# Patient Record
Sex: MALE | Race: WHITE | URBAN - METROPOLITAN AREA
[De-identification: names, ages, dates, MRNs, and addresses within clinical notes are randomized per-mention and may not be internally consistent; named-entity substitution may affect disease eponyms.]

---

## 2017-06-13 ENCOUNTER — TRANSFERRED RECORDS (OUTPATIENT)
Dept: HEALTH INFORMATION MANAGEMENT | Facility: CLINIC | Age: 22
End: 2017-06-13

## 2017-08-15 DIAGNOSIS — H53.2 DOUBLE VISION: Primary | ICD-10-CM

## 2017-08-16 ENCOUNTER — OFFICE VISIT (OUTPATIENT)
Dept: OPHTHALMOLOGY | Facility: CLINIC | Age: 22
End: 2017-08-16
Attending: OPHTHALMOLOGY
Payer: COMMERCIAL

## 2017-08-16 DIAGNOSIS — H50.05 ESOTROPIA, ALTERNATING: Primary | ICD-10-CM

## 2017-08-16 DIAGNOSIS — H53.2 DOUBLE VISION: ICD-10-CM

## 2017-08-16 PROCEDURE — 99215 OFFICE O/P EST HI 40 MIN: CPT | Mod: 25,ZF

## 2017-08-16 PROCEDURE — 92060 SENSORIMOTOR EXAMINATION: CPT | Mod: ZF | Performed by: OPHTHALMOLOGY

## 2017-08-16 RX ORDER — MESALAMINE 800 MG/1
800 TABLET, DELAYED RELEASE ORAL 3 TIMES DAILY
COMMUNITY

## 2017-08-16 RX ORDER — IRON POLYSACCHARIDE COMPLEX 150 MG
150 CAPSULE ORAL 2 TIMES DAILY
COMMUNITY

## 2017-08-16 ASSESSMENT — REFRACTION
OS_CYLINDER: SPHERE
OD_SPHERE: PLANO
OS_SPHERE: +0.50

## 2017-08-16 ASSESSMENT — VISUAL ACUITY
OD_CC: 20/20
CORRECTION_TYPE: CONTACTS
METHOD: SNELLEN - LINEAR
OS_CC: 20/20

## 2017-08-16 ASSESSMENT — REFRACTION_WEARINGRX
OS_SPHERE: -7.25
OD_SPHERE: -6.50

## 2017-08-16 ASSESSMENT — EXTERNAL EXAM - RIGHT EYE: OD_EXAM: NORMAL

## 2017-08-16 ASSESSMENT — TONOMETRY
OD_IOP_MMHG: 10
OS_IOP_MMHG: 8
IOP_METHOD: ICARE

## 2017-08-16 ASSESSMENT — EXTERNAL EXAM - LEFT EYE: OS_EXAM: NORMAL

## 2017-08-16 ASSESSMENT — CONF VISUAL FIELD
OS_NORMAL: 1
OD_NORMAL: 1

## 2017-08-16 ASSESSMENT — SLIT LAMP EXAM - LIDS
COMMENTS: NORMAL
COMMENTS: NORMAL

## 2017-08-16 NOTE — MR AVS SNAPSHOT
After Visit Summary   2017    Paulette Nieto    MRN: [id number]           Patient Information     Date Of Birth          1995        Visit Information        Provider Department      2017 1:30 PM Vicente Hammond MD [facility]        Today's Diagnoses     Esotropia, alternating - Both Eyes    -  1    Double vision           Follow-ups after your visit        Who to contact     Please call your clinic at [phone] to:    Ask questions about your health    Make or cancel appointments    Discuss your medicines    Learn about your test results    Speak to your doctor   If you have compliments or concerns about an experience at your clinic, or if you wish to file a complaint, please contact [facility] Patient Relations at [phone] or email us at [email or web address]         Additional Information About Your Visit        MyChart Information     Carefxt is an electronic gateway that provides easy, online access to your medical records. With [employer], you can request a clinic appointment, read your test results, renew a prescription or communicate with your care team.     To sign up for Carefxt visit the website at [email or web address]   You will be asked to enter the access code listed below, as well as some personal information. Please follow the directions to create your username and password.     Your access code is: [email or web address]  Expires: 11/15/2017  3:42 PM     Your access code will  in 90 days. If you need help or a new code, please contact your [facility] or call [phone] for assistance.        Care EveryWhere ID     This is your Care EveryWhere ID. This could be used by other organizations to access your [facility] medical records  [id number]         Blood Pressure from Last 3 Encounters:   No data found for BP    Weight from Last 3 Encounters:   No data found for Wt              We  Performed the Following     IOP Measurement     Lucinda-Operative Worksheet (Peds)     Sensorimotor        Primary Care Provider    None Specified       No primary provider on file.        Equal Access to Services     JM ROJAS : Tera Gandara, josé antonio cramer, marthajey romanhumbertovaleria ulloa, yves tate nayanjuliano walkeryessica dayrontabbybeena armando. So [facility] [phone].    ATENCIÓN: Si habla español, tiene a gannon disposición servicios gratuitos de asistencia lingüística. Llame al [phone].    We comply with applicable federal civil rights laws and [place] laws. We do not discriminate on the basis of race, color, national origin, age, disability sex, sexual orientation or gender identity.            Thank you!     Thank you for choosing [facility]  for your care. Our goal is always to provide you with excellent care. Hearing back from our patients is one way we can continue to improve our services. Please take a few minutes to complete the written survey that you may receive in the mail after your visit with us. Thank you!             Your Updated Medication List - Protect others around you: Learn how to safely use, store and throw away your medicines at www.disposemymeds.org.          This list is accurate as of: 8/16/17 11:59 PM.  Always use your most recent med list.                   Brand Name Dispense Instructions for use Diagnosis    iron polysaccharides 150 MG capsule    NIFEREX     Take 150 mg by mouth 2 times daily        mesalamine 800 MG EC tablet    ASACOL HD     Take 800 mg by mouth 3 times daily        PREDNISONE PO      Take 20 mg by mouth 2 times daily

## 2017-08-16 NOTE — LETTER
2017    RE: Paulette Nieto  : 1995  MRN: [id number]    Dear Dr. Cee,    Thank you for referring your patient, Paulette Nieto, to my neuro-ophthalmology clinic recently.  After a thorough neuro-ophthalmic history and examination, I came to the following conclusions:     1. Congenital esotropia with strong right eye preference and suppression in the left eye under binocular conditions most of the time:    The patient is a 22 year old here for the initial evaluation of crossing of the left eye referred by Dr. Sonia Cee at [facility]. This was present for many years and was probably first noted around the age of 20 years old.  He only has occasional binocular horizontal double vision. Eye misalignment or lazy eye not when patient was a young child. Has headache at least once a day. He denies pulsatile tinnitus. Dad does not have older pictures when his eyes were straight.     Afferent examination was normal. Efferent examination is significant for comitant esotropia and small hypertropia as well bilateral mild inferior oblique overaction worse in the left eye. There is a bilateral disassociated vertical deviation and refixation movements on alternate cover testing that support the congenital nature of his strabismus. Slit lamp and dilated fundus examination were unremarkable.    Paulette's presentation is consistent with longstanding esotropia manifest in early childhood.  In clinic he did not appear to have binocular potential suggesting a childhood small angle esotropia that may have increased in size with time making it more visible.  He was highly interested in reconstructive strabismus surgery.  This may or may not affect his occasional diplopia or headaches.    We discussed in detail the risks, benefits, and alternatives of eye muscle correction surgery including the very rare risk of death or serious morbidity from a general anesthesia complication and the rare risk of severe  vision loss in the operative eye(s) secondary to retinal detachment or endophthalmitis.  We discussed more likely sub-optimal outcomes including the unanticipated need for additional strabismus surgery.  Finally the patient was aware that while unlikely given his lack of binocularity, prisms glasses may be required to alleviate diplopia following surgery.    After a thorough discussion of these risks, the patient decided to proceed with strabismus surgery.  The surgical plan is as follows:     1. Bilateral medial rectus recessions (one on adjustable suture)    Follow-up 1 week after surgery.    Again, thank you for trusting me with the care of your patient.  For further exam details, please feel free to contact our office for additional records.  If you wish to contact me regarding this patient please email me at [email or web address] or give my clinic a call to arrange a phone conversation.    Sincerely,    Vicente Hammond MD  , Neuro-Ophthalmology and Adult Strabismus  Department of Ophthalmology and Visual Neurosciences  [facility]    DX: congenital esotropia

## 2017-08-16 NOTE — PROGRESS NOTES
1. Congenital esotropia with strong right eye preference and suppression in the left eye under binocular conditions most of the time:    The patient is a 22 year old here for the initial evaluation of crossing of the left eye referred by Dr. Sonia Cee at [facility]. This was present for many years and was probably first noted around the age of 20 years old.  He only has occasional binocular horizontal double vision. Eye misalignment or lazy eye not when patient was a young child. Has headache at least once a day. He denies pulsatile tinnitus. Dad does not have older pictures when his eyes were straight.     Afferent examination was normal. Efferent examination is significant for comitant esotropia and small hypertropia as well bilateral mild inferior oblique overaction worse in the left eye. There is a bilateral disassociated vertical deviation and refixation movements on alternate cover testing that support the congenital nature of his strabismus. Slit lamp and dilated fundus examination were unremarkable.    Paulette's presentation is consistent with longstanding esotropia manifest in early childhood.  In clinic he did not appear to have binocular potential suggesting a childhood small angle esotropia that may have increased in size with time making it more visible.  He was highly interested in reconstructive strabismus surgery.  This may or may not affect his occasional diplopia or headaches.    We discussed in detail the risks, benefits, and alternatives of eye muscle correction surgery including the very rare risk of death or serious morbidity from a general anesthesia complication and the rare risk of severe vision loss in the operative eye(s) secondary to retinal detachment or endophthalmitis.  We discussed more likely sub-optimal outcomes including the unanticipated need for additional strabismus surgery.  Finally the patient was aware that while unlikely given his lack of binocularity, prisms  glasses may be required to alleviate diplopia following surgery.    After a thorough discussion of these risks, the patient decided to proceed with strabismus surgery.  The surgical plan is as follows:     1. Bilateral medial rectus recessions (one on adjustable suture)    Follow-up 1 week after surgery.     Complete documentation of historical and exam elements from today's encounter can be found in the full encounter summary report (not reduplicated in this progress note).  I personally obtained the chief complaint(s) and history of present illness.  I confirmed and edited as necessary the review of systems, past medical/surgical history, family history, social history, and examination findings as documented by others; and I examined the patient myself.  I personally reviewed the relevant tests, images, and reports as documented above.  I formulated and edited as necessary the assessment and plan and discussed the findings and management plan with the patient and family     Vicente Hammond MD

## 2017-08-16 NOTE — NURSING NOTE
Chief Complaints and History of Present Illnesses   Patient presents with     Neurologic Problem     STRAB     HPI    Symptoms:              Comments:  Edis is a 22 year old male presenting with a history of:    1. Alternating esotropia  - unsure of presentation. Likely in childhood, but not in infancy   - no history of strabismus surgery.   - no history of patching.   - always LET. Never RET per patient account.   - c/o intermittent binocular horizontal diplopia. Worse when tired  - interested in surgical intervention.   - no brain MRI done recently.   - currently on 2-10 mg prednisone BID. For ulcerative colitis. Takes during flare-ups.         2. Myopia.  - wears contacts

## 2017-08-23 ENCOUNTER — TELEPHONE (OUTPATIENT)
Dept: OPHTHALMOLOGY | Facility: CLINIC | Age: 22
End: 2017-08-23

## 2017-08-23 NOTE — TELEPHONE ENCOUNTER
----- Message from Quan Lundberg sent at 8/23/2017  2:47 PM CDT -----  Regarding: Prior Authorization/Appeal  Hi Dr. Hammond,    Raúl's surgery is scheduled with you on 10-9-17. Dad called today and left a voice message for me today asking 'what delivery format  should be used to get records sent to the insurance company.' When I called dad back, he said he had called the insurance company this morning and they asked him for Raúl's records. I asked if prior authorization was needed for Raúl's surgery and he confirmed so I offered to call to obtain that for them.    When I called [facility] in [place], [place] ([phone]), I was told that prior authorization is not needed for outpatient surgeries, but that I should speak with the pre-certification department.    I spoke with Susanne in Pre-Certification who tells me that Dr. Cee at [facility] (referring provider) requested authorization for the patient to see you and that that request was denied. Susanne offered to fax forms to me to start the appeal process so that they could review your clinic notes to determine if surgery would be authorized. She said it can take up to 15 days. We should have plenty of time since surgery isn't scheduled until 10-9.    Just wanted to keep you in the loop.    Quan Ott

## 2017-10-06 ENCOUNTER — ANESTHESIA EVENT (OUTPATIENT)
Dept: SURGERY | Facility: AMBULATORY SURGERY CENTER | Age: 22
End: 2017-10-06

## 2017-10-09 ENCOUNTER — HOSPITAL ENCOUNTER (OUTPATIENT)
Facility: AMBULATORY SURGERY CENTER | Age: 22
End: 2017-10-09
Attending: OPHTHALMOLOGY

## 2017-10-09 ENCOUNTER — ANESTHESIA (OUTPATIENT)
Dept: SURGERY | Facility: AMBULATORY SURGERY CENTER | Age: 22
End: 2017-10-09

## 2017-10-09 ENCOUNTER — SURGERY (OUTPATIENT)
Age: 22
End: 2017-10-09

## 2017-10-09 VITALS
HEART RATE: 51 BPM | DIASTOLIC BLOOD PRESSURE: 72 MMHG | TEMPERATURE: 97.3 F | BODY MASS INDEX: 23.7 KG/M2 | HEIGHT: 69 IN | SYSTOLIC BLOOD PRESSURE: 121 MMHG | RESPIRATION RATE: 12 BRPM | OXYGEN SATURATION: 100 % | WEIGHT: 160 LBS

## 2017-10-09 DIAGNOSIS — Z98.890 POSTOPERATIVE EYE STATE: Primary | ICD-10-CM

## 2017-10-09 RX ORDER — DEXAMETHASONE SODIUM PHOSPHATE 4 MG/ML
INJECTION, SOLUTION INTRA-ARTICULAR; INTRALESIONAL; INTRAMUSCULAR; INTRAVENOUS; SOFT TISSUE PRN
Status: DISCONTINUED | OUTPATIENT
Start: 2017-10-09 | End: 2017-10-09

## 2017-10-09 RX ORDER — LIDOCAINE HYDROCHLORIDE 20 MG/ML
INJECTION, SOLUTION INFILTRATION; PERINEURAL PRN
Status: DISCONTINUED | OUTPATIENT
Start: 2017-10-09 | End: 2017-10-09

## 2017-10-09 RX ORDER — PROPOFOL 10 MG/ML
INJECTION, EMULSION INTRAVENOUS CONTINUOUS PRN
Status: DISCONTINUED | OUTPATIENT
Start: 2017-10-09 | End: 2017-10-09

## 2017-10-09 RX ORDER — ONDANSETRON 2 MG/ML
4 INJECTION INTRAMUSCULAR; INTRAVENOUS EVERY 30 MIN PRN
Status: DISCONTINUED | OUTPATIENT
Start: 2017-10-09 | End: 2017-10-10 | Stop reason: HOSPADM

## 2017-10-09 RX ORDER — NALOXONE HYDROCHLORIDE 0.4 MG/ML
.1-.4 INJECTION, SOLUTION INTRAMUSCULAR; INTRAVENOUS; SUBCUTANEOUS
Status: DISCONTINUED | OUTPATIENT
Start: 2017-10-09 | End: 2017-10-10 | Stop reason: HOSPADM

## 2017-10-09 RX ORDER — KETOROLAC TROMETHAMINE 30 MG/ML
INJECTION, SOLUTION INTRAMUSCULAR; INTRAVENOUS PRN
Status: DISCONTINUED | OUTPATIENT
Start: 2017-10-09 | End: 2017-10-09

## 2017-10-09 RX ORDER — TETRACAINE HYDROCHLORIDE 5 MG/ML
1-2 SOLUTION OPHTHALMIC
Status: DISCONTINUED | OUTPATIENT
Start: 2017-10-09 | End: 2017-10-10 | Stop reason: HOSPADM

## 2017-10-09 RX ORDER — SODIUM CHLORIDE, SODIUM LACTATE, POTASSIUM CHLORIDE, CALCIUM CHLORIDE 600; 310; 30; 20 MG/100ML; MG/100ML; MG/100ML; MG/100ML
INJECTION, SOLUTION INTRAVENOUS CONTINUOUS
Status: DISCONTINUED | OUTPATIENT
Start: 2017-10-09 | End: 2017-10-10 | Stop reason: HOSPADM

## 2017-10-09 RX ORDER — FENTANYL CITRATE 50 UG/ML
INJECTION, SOLUTION INTRAMUSCULAR; INTRAVENOUS PRN
Status: DISCONTINUED | OUTPATIENT
Start: 2017-10-09 | End: 2017-10-09

## 2017-10-09 RX ORDER — GABAPENTIN 300 MG/1
300 CAPSULE ORAL ONCE
Status: COMPLETED | OUTPATIENT
Start: 2017-10-09 | End: 2017-10-09

## 2017-10-09 RX ORDER — ACETAMINOPHEN 325 MG/1
975 TABLET ORAL ONCE
Status: COMPLETED | OUTPATIENT
Start: 2017-10-09 | End: 2017-10-09

## 2017-10-09 RX ORDER — NALOXONE HYDROCHLORIDE 0.4 MG/ML
INJECTION, SOLUTION INTRAMUSCULAR; INTRAVENOUS; SUBCUTANEOUS PRN
Status: DISCONTINUED | OUTPATIENT
Start: 2017-10-09 | End: 2017-10-09

## 2017-10-09 RX ORDER — LABETALOL HYDROCHLORIDE 5 MG/ML
10 INJECTION, SOLUTION INTRAVENOUS
Status: DISCONTINUED | OUTPATIENT
Start: 2017-10-09 | End: 2017-10-09 | Stop reason: HOSPADM

## 2017-10-09 RX ORDER — SODIUM CHLORIDE, SODIUM LACTATE, POTASSIUM CHLORIDE, CALCIUM CHLORIDE 600; 310; 30; 20 MG/100ML; MG/100ML; MG/100ML; MG/100ML
INJECTION, SOLUTION INTRAVENOUS CONTINUOUS
Status: DISCONTINUED | OUTPATIENT
Start: 2017-10-09 | End: 2017-10-09 | Stop reason: HOSPADM

## 2017-10-09 RX ORDER — LIDOCAINE 40 MG/G
CREAM TOPICAL
Status: DISCONTINUED | OUTPATIENT
Start: 2017-10-09 | End: 2017-10-09 | Stop reason: HOSPADM

## 2017-10-09 RX ORDER — PROPOFOL 10 MG/ML
INJECTION, EMULSION INTRAVENOUS PRN
Status: DISCONTINUED | OUTPATIENT
Start: 2017-10-09 | End: 2017-10-09

## 2017-10-09 RX ORDER — ONDANSETRON 2 MG/ML
INJECTION INTRAMUSCULAR; INTRAVENOUS PRN
Status: DISCONTINUED | OUTPATIENT
Start: 2017-10-09 | End: 2017-10-09

## 2017-10-09 RX ORDER — FENTANYL CITRATE 50 UG/ML
25-50 INJECTION, SOLUTION INTRAMUSCULAR; INTRAVENOUS
Status: DISCONTINUED | OUTPATIENT
Start: 2017-10-09 | End: 2017-10-09 | Stop reason: HOSPADM

## 2017-10-09 RX ORDER — PREDNISOLONE ACETATE 10 MG/ML
SUSPENSION/ DROPS OPHTHALMIC
Qty: 1 BOTTLE | Refills: 0 | Status: SHIPPED | OUTPATIENT
Start: 2017-10-09

## 2017-10-09 RX ORDER — MEPERIDINE HYDROCHLORIDE 25 MG/ML
12.5 INJECTION INTRAMUSCULAR; INTRAVENOUS; SUBCUTANEOUS
Status: DISCONTINUED | OUTPATIENT
Start: 2017-10-09 | End: 2017-10-10 | Stop reason: HOSPADM

## 2017-10-09 RX ORDER — ONDANSETRON 4 MG/1
4 TABLET, ORALLY DISINTEGRATING ORAL EVERY 30 MIN PRN
Status: DISCONTINUED | OUTPATIENT
Start: 2017-10-09 | End: 2017-10-10 | Stop reason: HOSPADM

## 2017-10-09 RX ORDER — BALANCED SALT SOLUTION 6.4; .75; .48; .3; 3.9; 1.7 MG/ML; MG/ML; MG/ML; MG/ML; MG/ML; MG/ML
SOLUTION OPHTHALMIC PRN
Status: DISCONTINUED | OUTPATIENT
Start: 2017-10-09 | End: 2017-10-09 | Stop reason: HOSPADM

## 2017-10-09 RX ORDER — FENTANYL CITRATE 50 UG/ML
25-50 INJECTION, SOLUTION INTRAMUSCULAR; INTRAVENOUS EVERY 5 MIN PRN
Status: DISCONTINUED | OUTPATIENT
Start: 2017-10-09 | End: 2017-10-09 | Stop reason: HOSPADM

## 2017-10-09 RX ORDER — OXYMETAZOLINE HYDROCHLORIDE 0.05 G/100ML
SPRAY NASAL PRN
Status: DISCONTINUED | OUTPATIENT
Start: 2017-10-09 | End: 2017-10-09 | Stop reason: HOSPADM

## 2017-10-09 RX ADMIN — PROPOFOL 150 MG: 10 INJECTION, EMULSION INTRAVENOUS at 07:18

## 2017-10-09 RX ADMIN — BALANCED SALT SOLUTION 10 ML: 6.4; .75; .48; .3; 3.9; 1.7 SOLUTION OPHTHALMIC at 07:31

## 2017-10-09 RX ADMIN — LIDOCAINE HYDROCHLORIDE 60 MG: 20 INJECTION, SOLUTION INFILTRATION; PERINEURAL at 07:18

## 2017-10-09 RX ADMIN — GABAPENTIN 300 MG: 300 CAPSULE ORAL at 06:19

## 2017-10-09 RX ADMIN — KETOROLAC TROMETHAMINE 30 MG: 30 INJECTION, SOLUTION INTRAMUSCULAR; INTRAVENOUS at 08:03

## 2017-10-09 RX ADMIN — PROPOFOL 50 MG: 10 INJECTION, EMULSION INTRAVENOUS at 07:30

## 2017-10-09 RX ADMIN — ONDANSETRON 4 MG: 2 INJECTION INTRAMUSCULAR; INTRAVENOUS at 07:18

## 2017-10-09 RX ADMIN — PROPOFOL: 10 INJECTION, EMULSION INTRAVENOUS at 07:45

## 2017-10-09 RX ADMIN — NALOXONE HYDROCHLORIDE 40 MCG: 0.4 INJECTION, SOLUTION INTRAMUSCULAR; INTRAVENOUS; SUBCUTANEOUS at 08:18

## 2017-10-09 RX ADMIN — DEXAMETHASONE SODIUM PHOSPHATE 4 MG: 4 INJECTION, SOLUTION INTRA-ARTICULAR; INTRALESIONAL; INTRAMUSCULAR; INTRAVENOUS; SOFT TISSUE at 07:18

## 2017-10-09 RX ADMIN — FENTANYL CITRATE 50 MCG: 50 INJECTION, SOLUTION INTRAMUSCULAR; INTRAVENOUS at 07:13

## 2017-10-09 RX ADMIN — OXYMETAZOLINE HYDROCHLORIDE 2 SPRAY: 0.05 SPRAY NASAL at 07:24

## 2017-10-09 RX ADMIN — FENTANYL CITRATE 50 MCG: 50 INJECTION, SOLUTION INTRAMUSCULAR; INTRAVENOUS at 07:34

## 2017-10-09 RX ADMIN — PROPOFOL 200 MCG/KG/MIN: 10 INJECTION, EMULSION INTRAVENOUS at 07:18

## 2017-10-09 RX ADMIN — SODIUM CHLORIDE, SODIUM LACTATE, POTASSIUM CHLORIDE, CALCIUM CHLORIDE: 600; 310; 30; 20 INJECTION, SOLUTION INTRAVENOUS at 07:13

## 2017-10-09 RX ADMIN — FENTANYL CITRATE 50 MCG: 50 INJECTION, SOLUTION INTRAMUSCULAR; INTRAVENOUS at 07:25

## 2017-10-09 RX ADMIN — ACETAMINOPHEN 975 MG: 325 TABLET ORAL at 06:19

## 2017-10-09 RX ADMIN — PROPOFOL 50 MG: 10 INJECTION, EMULSION INTRAVENOUS at 07:25

## 2017-10-09 NOTE — IP AVS SNAPSHOT
[facility]    [address] [place] [id number]    Phone:  [phone]    Fax:  [phone]                                       After Visit Summary   10/9/2017    Raúl Nieto    MRN: [id number]           After Visit Summary Signature Page     I have received my discharge instructions, and my questions have been answered. I have discussed any challenges I see with this plan with the nurse or doctor.    ..........................................................................................................................................  Patient/Patient Representative Signature      ..........................................................................................................................................  Patient Representative Print Name and Relationship to Patient    ..................................................               ................................................  Date                                            Time    ..........................................................................................................................................  Reviewed by Signature/Title    ...................................................              ..............................................  Date                                                            Time

## 2017-10-09 NOTE — ANESTHESIA CARE TRANSFER NOTE
Patient: Raúl Nieto    Procedure(s):  Bilateral Strabismus Repair - Wound Class: I-Clean    Diagnosis: Strabismus  Diagnosis Additional Information: No value filed.    Anesthesia Type:   General     Note:  Airway :Nasal Cannula  Patient transferred to:PACU  Comments: Arrive PACU, Stable, Airway Intact  98/44, 58,18,96%      Vitals: (Last set prior to Anesthesia Care Transfer)    CRNA VITALS  10/9/2017 0753 - 10/9/2017 0832      10/9/2017             NIBP: (!)  88/35    Pulse: (!)  46    NIBP Mean: 56    SpO2: 98 %    Resp Rate (observed): 11    Resp Rate (set): 10                Electronically Signed By: RHONDA Kwan CRNA  October 9, 2017  8:32 AM

## 2017-10-09 NOTE — ANESTHESIA POSTPROCEDURE EVALUATION
Patient: Raúl Nieto    Procedure(s):  Bilateral Strabismus Repair - Wound Class: I-Clean    Diagnosis:Strabismus  Diagnosis Additional Information: No value filed.    Anesthesia Type:  General    Note:  Anesthesia Post Evaluation    Patient location during evaluation: PACU  Patient participation: Able to fully participate in evaluation  Level of consciousness: awake  Pain management: adequate  Airway patency: patent  Cardiovascular status: acceptable  Respiratory status: acceptable  Hydration status: acceptable  PONV: none             Last vitals:  Vitals:    10/09/17 0845 10/09/17 0903 10/09/17 0918   BP: 102/58 117/48 121/72   Pulse:  51    Resp: 16  12   Temp: 36.2  C (97.1  F)  36.3  C (97.3  F)   SpO2: 98% 98% 100%         Electronically Signed By: Jakob George MD  October 9, 2017  10:58 AM

## 2017-10-09 NOTE — BRIEF OP NOTE
[facility]    Brief Operative Note    Pre-operative diagnosis: Strabismus  Post-operative diagnosis Strabismus  Procedure: Procedure(s):  Bilateral Strabismus Repair, fixed suture - Wound Class: I-Clean  Surgeon: Surgeon(s) and Role:     * Vicente Hammond MD - Primary  Anesthesia: General   Estimated blood loss: Less than 1 ml  Drains: None  Specimens: None.  Findings:   Please see full operative report.  Complications: None.  Implants: None.

## 2017-10-09 NOTE — DISCHARGE INSTRUCTIONS
Instructions for after your eye muscle surgery:    Instill 1 cm of Tobradex ophthalmic ointment in the operative eye(s) in 3 times per day until follow-up with Dr. Hammond in about 1 week.  The ointment is expected to blur vision but is necessary.      You will also go home with a prescription for a steroid eye drop. Do NOT start this eye drop yet.  When you see Dr. Hammond at your post-operative visit he will tell you if and when to start the drops.    Avoid all eye pressure or trauma for 7 days.  No eye rubbing, straining, swimming, athletics, or outdoor activities in which dirt or foreign objects could enter the eye.      No water in the face for 1 week- ok to take a bath and shower immediately but don t run shower water on face.      Tylenol or ibuprofen over the counter may be used for pain.  Pain can occasionally be moderate for 1 or 2 days after surgery.  If pain is severe or does not improve greatly with over the counter medications call our office.    Return for follow-up with Dr. Hammond as already scheduled (generally about 1 week after surgery).  If you do not have an appointment already, please call Quan Lundberg at [phone] or our  at [phone] and arrange to follow-up in 1 week.    [facility]  Home Care Following Anesthesia  For 24 hours after surgery:  1. Get plenty of rest.  A responsible adult must stay with you for at least 24 hours after you leave the surgery center.  2. Do not drive or use heavy equipment.  If you have weakness or tingling, don't drive or use heavy equipment until this feeling goes away.   3. Do not drink alcohol.   4. Avoid strenuous or risky activities.  Ask for help when climbing stairs.  5. You may feel lightheaded.  IF so, sit for a few minutes before standing.  Have someone help you get up.   6. If you have nausea (feel sick to your stomach): Drink only clear liquids such as apple juice, ginger ale,  broth or 7-Up.  Rest may also help.  Be sure to drink enough fluids.  Move to a regular diet as you feel able.   7. You may have a slight fever.  Call the doctor if your fever is over 100 F (37.7 C) (taken under the tongue) or lasts longer than 24 hours.  8. You may have a dry mouth, a sore throat, muscle aches or trouble sleeping. These should go away after 24 hours.  9. Do not make important or legal decisions.             Tips for taking pain medications  To get the best pain relief possible, remember these points:    Take pain medications as directed, before pain becomes severe.    Pain medication can upset your stomach: taking it with food may help.    Constipation is a common side effect of pain medication. Drink plenty of  fluids.    Eat foods high in fiber. Take a stool softener if recommended by your doctor or pharmacist.    Do not drink alcohol, drive or operate machinery while taking pain medications.    Ask about other ways to control pain, such as with heat, ice or relaxation.    Tylenol/Acetaminophen Consumption  To help encourage the safe use of acetaminophen, the makers of TYLENOL  have lowered the maximum daily dose for single-ingredient Extra Strength TYLENOL  (acetaminophen) products sold in the [place] from 8 pills per day (4,000 mg) to 6 pills per day (3,000 mg). The dosing interval has also changed from 2 pills every 4-6 hours to 2 pills every 6 hours.    If you feel your pain relief is insufficient, you may take Tylenol/Acetaminophen in addition to your narcotic pain medication.     Be careful not to exceed 3,000 mg of Tylenol/Acetaminophen in a 24 hour period from all sources.    If you are taking extra strength Tylenol/acetaminophen (500 mg), the maximum dose is 6 tablets in 24 hours.    If you are taking regular strength acetaminophen (325 mg), the maximum dose is 9 tablets in 24 hours.    Call a doctor for any of the followin. Signs of infection (fever, growing tenderness at the surgery  site, a large amount of drainage or bleeding, severe pain, foul-smelling drainage, redness, swelling).  2. It has been over 8 to 10 hours since surgery and you are still not able to urinate (pass water).  3. Headache for over 24 hours.    Your doctor is:       Dr. Vicente Hammond, Ophthalmology: [phone]               Or dial [phone] and ask for the resident on call for:  Ophthalmology  For emergency care, call the:  [facility]:  [phone] (TTY for hearing impaired: [phone])

## 2017-10-09 NOTE — OP NOTE
ATTENDING SURGEON:  Vicente Hammond MD    DATE OF PROCEDURE:  October 9, 2017    FIRST SURGICAL ASSISTANT:  TOLU JESUS MD     ANESTHESIA:  General anesthesia    PREOPERATIVE DIAGNOSIS (ES):  1. 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 Right medial rectus recession 4.0 mm with 1/2 tendon width superior transposition  2. Left medial rectus recession 4.0 mm with 1/2 tendon width superior transposition    INDICATIONS FOR PROCEDURE:    The patient is a pleasant 22 year old male who noted esotropia since at least age 20 if not earlier.  Exam suggested a longstanding esotropia and he usually suppressed the left eye under binocular conditions.  He was eager to proceed with strabismus to re-align his eyes.  As he did not bring a pair of glasses and he typically wears contacts, we opted to defer use of adjustable suture given that he would be unable to fixate a target without correction given his approximate -7 myopic prescription in both eyes.    I warned the patient about the risks, benefits, and alternatives of eye muscle surgery including a relatively small risk for severe infection, retinal detachment, and permanent vision loss of the eye.  I also discussed the possibility of postoperative double vision.  I discussed the possibility that due to postoperative double vision or a postoperative recurrent strabismus, the patient may require additional strabismus procedures in the future.  Understanding these risks, the patient decided to proceed with strabismus surgery.      DESCRIPTION OF PROCEDURE:    After the risks, benefits, and alternatives of eye muscle surgery were discussed with the patient and the patient's questions were answered both in clinic and on the day of surgery, written informed consent was obtained and witnessed in the preoperative holding area on the  "day of surgery.  The word \"yes\" was written over both eyes indicating that the patient consented to eye muscle correction in both eyes.  An intravenous line was placed and light intravenous sedation was given.  The patient was transported to the operating room where after appropriate monitors were placed, the patient underwent induction of general anesthesia without complication.      Both eyes were prepped and draped in the usual sterile fashion for ophthalmic surgery and a lid speculum was placed in the right eye.  Forced duction testing in this eye revealed no restriction.  A trapezoidal nasal conjunctival flap was created using conjunctival forceps and William scissors.  This was reflected backwards to expose the right medial rectus muscle which was isolated using a Jamil muscle hook.  The muscle was freed from Tenon's capsule with blunt dissection using a William scissors and cotton swab.  A double armed 6-0 Vicryl suture was then woven across the width of the muscle near it's insertion of the globe and tied to the edges.  The muscle was disinserted from the globe using William scissors.  Both arms of the 6-0 Vicryl suture were then passed partial thickness through the sclera at a point measured to be 4.0 mm posterior and 1/2 tendon width superior to the physiologic muscle insertion using a caliper.  At this point, the ends of the suture were tied together.  The needles were cut off and the ends were cut short.  The conjunctival flap was then re-opposed in the surgical bed and held in place using two 6-0 plain gut sutures.  The lid speculum was removed from the operative eye.     A lid speculum was placed in the left eye.  Forced duction testing in this eye revealed no restriction.  A trapezoidal nasal conjunctival flap was created using conjunctival forceps and William scissors.  This was reflected backwards to expose the left medial rectus muscle which was isolated using a [place] muscle hook.  The " muscle was freed from Tenon's capsule with blunt dissection using a William scissors and cotton swab.  A double armed 6-0 Vicryl suture was then woven across the width of the muscle near it's insertion of the globe and tied to the edges.  The muscle was disinserted from the globe using William scissors.  Both arms of the 6-0 Vicryl suture were then passed partial thickness through the sclera at a point measured to be 4.0 mm posterior and 1/2 tendon width superior to the physiologic muscle insertion using a caliper.  At this point, the ends of the suture were tied together.  The needles were cut off and the ends were cut short.  The conjunctival flap was then re-opposed in the surgical bed and held in place using two 6-0 plain gut sutures.  The lid speculum was removed from the operative eye.     TobraDex ointment was placed in both eyes.  The patient was awakened from general anesthesia without complication and discharged to the recovery room in stable condition.       SPECIMENS REMOVED:  None.      ESTIMATED BLOOD LOSS:  1 mL or less.    INTRAOPERATIVE FLUIDS:  As per anesthesia records.      SPONGE/INSTRUMENT/NEEDLE COUNTS:  All sponge, instrument, and needle counts were correct times two.    CONDITION ON DISCHARGE FROM OPERATING ROOM:  Stable.      COMPLICATIONS:  None.     I was present for the entire procedure

## 2017-10-09 NOTE — ANESTHESIA PREPROCEDURE EVALUATION
Anesthesia Evaluation     . Pt has had prior anesthetic. Type: General           ROS/MED HX    ENT/Pulmonary:  - neg pulmonary ROS     Neurologic:  - neg neurologic ROS     Cardiovascular:  - neg cardiovascular ROS       METS/Exercise Tolerance:  >4 METS   Hematologic:  - neg hematologic  ROS       Musculoskeletal:  - neg musculoskeletal ROS       GI/Hepatic:  - neg GI/hepatic ROS   (+) Inflammatory bowel disease,       Renal/Genitourinary:  - ROS Renal section negative       Endo:  - neg endo ROS       Psychiatric:  - neg psychiatric ROS       Infectious Disease:  - neg infectious disease ROS       Malignancy:   (+) Malignancy History of Other  Other CA Active status post         Other:    (+) No chance of pregnancy C-spine cleared: N/A, no H/O Chronic Pain,no other significant disability                    Physical Exam  Normal systems: pulmonary and dental    Airway   Mallampati: I    Dental     Cardiovascular   Rhythm and rate: regular and normal      Pulmonary                     Anesthesia Plan      History & Physical Review      ASA Status:  2 .    NPO Status:  > 8 hours    Plan for General and LMA with Intravenous and Propofol induction. Maintenance will be TIVA.    PONV prophylaxis:  Ondansetron (or other 5HT-3)       Postoperative Care  Postoperative pain management:  IV analgesics.      Consents  Anesthetic plan, risks, benefits and alternatives discussed with:  Patient..                          .

## 2017-10-09 NOTE — IP AVS SNAPSHOT
MRN:[id number]                      After Visit Summary   10/9/2017    Raúl Nieto    MRN: [id number]           Thank you!     Thank you for choosing [facility] for your care. Our goal is always to provide you with excellent care. Hearing back from our patients is one way we can continue to improve our services. Please take a few minutes to complete the written survey that you may receive in the mail after you visit with us. Thank you!        Patient Information     Date Of Birth          1995        About your hospital stay     You were admitted on:  October 9, 2017 [facility]    You were discharged on:  October 9, 2017       Who to Call     For medical emergencies, please call 911.  For non-urgent questions about your medical care, please call your primary care provider or clinic, [phone]  For questions related to your surgery, please call your surgery clinic        Attending Provider     Provider Specialty    Vicente Hammond MD Ophthalmology       Primary Care Provider Office Phone # Fax #    [facility] [phone]      After Care Instructions     Discharge Medication Instructions       Do NOT take aspirin or medications containing NSAIDS for 72 hours after procedure.                  Follow-up Appointments     Follow Up - Post-Op Follow-Up Visit       As scheduled.  Bring all eye medications to follow up appointment.                  Your next 10 appointments already scheduled     Oct 18, 2017 12:30 PM CDT   Post-Op with Vicente Hammond MD   Eye Clinic ([facility])    [address]  [address] [facility]  [address] [address] [place] [id number] [phone]              Further instructions from your care team       Instructions for after your eye muscle surgery:    Instill 1 cm of Tobradex ophthalmic ointment in the operative eye(s) in 3 times per day until  follow-up with Dr. Hammond in about 1 week.  The ointment is expected to blur vision but is necessary.      You will also go home with a prescription for a steroid eye drop. Do NOT start this eye drop yet.  When you see Dr. Hammond at your post-operative visit he will tell you if and when to start the drops.    Avoid all eye pressure or trauma for 7 days.  No eye rubbing, straining, swimming, athletics, or outdoor activities in which dirt or foreign objects could enter the eye.      No water in the face for 1 week- ok to take a bath and shower immediately but don t run shower water on face.      Tylenol or ibuprofen over the counter may be used for pain.  Pain can occasionally be moderate for 1 or 2 days after surgery.  If pain is severe or does not improve greatly with over the counter medications call our office.    Return for follow-up with Dr. Hammond as already scheduled (generally about 1 week after surgery).  If you do not have an appointment already, please call [facility] at [phone] or our  at [phone] and arrange to follow-up in 1 week.    [facility]  Home Care Following Anesthesia  For 24 hours after surgery:  1. Get plenty of rest.  A responsible adult must stay with you for at least 24 hours after you leave the surgery center.  2. Do not drive or use heavy equipment.  If you have weakness or tingling, don't drive or use heavy equipment until this feeling goes away.   3. Do not drink alcohol.   4. Avoid strenuous or risky activities.  Ask for help when climbing stairs.  5. You may feel lightheaded.  IF so, sit for a few minutes before standing.  Have someone help you get up.   6. If you have nausea (feel sick to your stomach): Drink only clear liquids such as apple juice, ginger ale, broth or 7-Up.  Rest may also help.  Be sure to drink enough fluids.  Move to a regular diet as you feel able.   7. You may have a slight fever.   Call the doctor if your fever is over 100 F (37.7 C) (taken under the tongue) or lasts longer than 24 hours.  8. You may have a dry mouth, a sore throat, muscle aches or trouble sleeping. These should go away after 24 hours.  9. Do not make important or legal decisions.             Tips for taking pain medications  To get the best pain relief possible, remember these points:    Take pain medications as directed, before pain becomes severe.    Pain medication can upset your stomach: taking it with food may help.    Constipation is a common side effect of pain medication. Drink plenty of  fluids.    Eat foods high in fiber. Take a stool softener if recommended by your doctor or pharmacist.    Do not drink alcohol, drive or operate machinery while taking pain medications.    Ask about other ways to control pain, such as with heat, ice or relaxation.    Tylenol/Acetaminophen Consumption  To help encourage the safe use of acetaminophen, the makers of TYLENOL  have lowered the maximum daily dose for single-ingredient Extra Strength TYLENOL  (acetaminophen) products sold in the [place] from 8 pills per day (4,000 mg) to 6 pills per day (3,000 mg). The dosing interval has also changed from 2 pills every 4-6 hours to 2 pills every 6 hours.    If you feel your pain relief is insufficient, you may take Tylenol/Acetaminophen in addition to your narcotic pain medication.     Be careful not to exceed 3,000 mg of Tylenol/Acetaminophen in a 24 hour period from all sources.    If you are taking extra strength Tylenol/acetaminophen (500 mg), the maximum dose is 6 tablets in 24 hours.    If you are taking regular strength acetaminophen (325 mg), the maximum dose is 9 tablets in 24 hours.    Call a doctor for any of the followin. Signs of infection (fever, growing tenderness at the surgery site, a large amount of drainage or bleeding, severe pain, foul-smelling drainage, redness, swelling).  2. It has been over 8 to 10 hours since  "surgery and you are still not able to urinate (pass water).  3. Headache for over 24 hours.    Your doctor is:       Dr. Vicente Hammond, Ophthalmology: [phone]               Or dial [phone] and ask for the resident on call for:  Ophthalmology  For emergency care, call the:  [facility]:  [phone] (TTY for hearing impaired: [phone])                Pending Results     No orders found from 10/7/2017 to 10/10/2017.            Admission Information     Date & Time Provider Department Dept. Phone    10/9/2017 Vicente Hammond MD [facility] [phone]      Your Vitals Were     Blood Pressure Pulse Temperature Respirations Height Weight    108/71 49 97.8  F (36.6  C) (Oral) 16 1.753 m (5' 9\") 72.6 kg (160 lb)    Pulse Oximetry BMI (Body Mass Index)                97% 23.63 kg/m2          [employer] Information     [employer] is an electronic gateway that provides easy, online access to your medical records. With [employer], you can request a clinic appointment, read your test results, renew a prescription or communicate with your care team.     To sign up for [employer] visit the website at [email or web address]   You will be asked to enter the access code listed below, as well as some personal information. Please follow the directions to create your username and password.     Your access code is: [email or web address]  Expires: 11/15/2017  3:42 PM     Your access code will  in 90 days. If you need help or a new code, please contact your [facility] or call [phone] for assistance.        Care EveryWhere ID     This is your Care EveryWhere ID. This could be used by other organizations to access your [facility] medical records  [id number]        Equal Access to Services     JM ROJAS AH: Tera Gandara, waaxda luqadaha, qaybta kaalmada artemio, yves armando. So wa " [phone].    ATENCIÓN: Si deshaun allred, tiene a gannon disposición servicios gratuitos de asistencia lingüística. Patricia rojas [phone].    We comply with applicable federal civil rights laws and [place] laws. We do not discriminate on the basis of race, color, national origin, age, disability, sex, sexual orientation, or gender identity.               Review of your medicines      START taking        Dose / Directions    prednisoLONE acetate 1 % ophthalmic susp   Commonly known as:  PRED FORTE   Used for:  Postoperative eye state        Place 1 drop into operative eye(s) 4 times daily   Quantity:  1 Bottle   Refills:  0         CONTINUE these medicines which have NOT CHANGED        Dose / Directions    iron polysaccharides 150 MG capsule   Commonly known as:  NIFEREX        Dose:  150 mg   Take 150 mg by mouth 2 times daily   Refills:  0       mesalamine 800 MG EC tablet   Commonly known as:  ASACOL HD        Dose:  800 mg   Take 800 mg by mouth 3 times daily   Refills:  0       PREDNISONE PO        Dose:  20 mg   Take 20 mg by mouth 2 times daily   Refills:  0            Where to get your medicines      These medications were sent to [address] [address]    Hours:  TRANSPLANT PHONE NUMBER [phone] Phone:  [phone]     prednisoLONE acetate 1 % ophthalmic susp                Protect others around you: Learn how to safely use, store and throw away your medicines at www.disposemymeds.org.             Medication List: This is a list of all your medications and when to take them. Check marks below indicate your daily home schedule. Keep this list as a reference.      Medications           Morning Afternoon Evening Bedtime As Needed    iron polysaccharides 150 MG capsule   Commonly known as:  NIFEREX   Take 150 mg by mouth 2 times daily                                mesalamine 800 MG EC tablet    Commonly known as:  ASACOL HD   Take 800 mg by mouth 3 times daily                                prednisoLONE acetate 1 % ophthalmic susp   Commonly known as:  PRED FORTE   Place 1 drop into operative eye(s) 4 times daily                                PREDNISONE PO   Take 20 mg by mouth 2 times daily

## 2017-10-13 ENCOUNTER — TELEPHONE (OUTPATIENT)
Dept: OPHTHALMOLOGY | Facility: CLINIC | Age: 22
End: 2017-10-13

## 2017-10-18 ENCOUNTER — OFFICE VISIT (OUTPATIENT)
Dept: OPHTHALMOLOGY | Facility: CLINIC | Age: 22
End: 2017-10-18
Attending: OPHTHALMOLOGY
Payer: COMMERCIAL

## 2017-10-18 DIAGNOSIS — H50.05 ESOTROPIA, ALTERNATING: Primary | ICD-10-CM

## 2017-10-18 PROCEDURE — 99212 OFFICE O/P EST SF 10 MIN: CPT | Mod: ZF

## 2017-10-18 ASSESSMENT — TONOMETRY
OS_IOP_MMHG: 14
IOP_METHOD: ICARE
OD_IOP_MMHG: 15

## 2017-10-18 ASSESSMENT — VISUAL ACUITY
OS_SC: 20/600
METHOD: SNELLEN - LINEAR
OD_PH_SC: 20/50
OS_PH_SC: 20/40
OD_SC: 20/600

## 2017-10-18 ASSESSMENT — REFRACTION_WEARINGRX
OS_SPHERE: -7.25
OD_SPHERE: -6.50

## 2017-10-18 NOTE — MR AVS SNAPSHOT
After Visit Summary   10/18/2017    Raúl Nieto    MRN: [id number]           Patient Information     Date Of Birth          1995        Visit Information        Provider Department      10/18/2017 12:30 PM Vicente Hammond MD Eye Clinic         Follow-ups after your visit        Your next 10 appointments already scheduled     Dec 14, 2017 12:30 PM CST   Post-Op with Vicente Hammond MD   Eye Clinic ([facility])    [address]  [address] [facility]  [address] Clin [address]  [place] [address] [phone]              Who to contact     Please call your clinic at [phone] to:    Ask questions about your health    Make or cancel appointments    Discuss your medicines    Learn about your test results    Speak to your doctor   If you have compliments or concerns about an experience at your clinic, or if you wish to file a complaint, please contact [facility] Patient Relations at [phone] or email us at [email or web address]         Additional Information About Your Visit        FITiSThart Information     Glipho is an electronic gateway that provides easy, online access to your medical records. With Glipho, you can request a clinic appointment, read your test results, renew a prescription or communicate with your care team.     To sign up for Glipho visit the website at [email or web address]   You will be asked to enter the access code listed below, as well as some personal information. Please follow the directions to create your username and password.     Your access code is: [email or web address]  Expires: 11/15/2017  3:42 PM     Your access code will  in 90 days. If you need help or a new code, please contact your [facility] or call [phone] for assistance.        Care EveryWhere ID     This is your Care EveryWhere ID. This could be used by other organizations to access your  [facility] medical records  [id number]         Blood Pressure from Last 3 Encounters:   10/09/17 121/72    Weight from Last 3 Encounters:   10/09/17 72.6 kg (160 lb)              Today, you had the following     No orders found for display       Primary Care Provider Office Phone # Fax #    [facility] [phone]       [address] [place] [address]        Equal Access to Services     JM ROJAS : Hadii aad ku hadasho Soomaali, waaxda luqadaha, qaybta kaalmada adeegyada, waxay idiin hayaan adeeg dayrontabbysh lakobe . So [facility] [phone].    ATENCIÓN: Si habla español, tiene a gannon disposición servicios gratuitos de asistencia lingüística. Llame al [phone].    We comply with applicable federal civil rights laws and [place] laws. We do not discriminate on the basis of race, color, national origin, age, disability, sex, sexual orientation, or gender identity.            Thank you!     Thank you for choosing [facility]  for your care. Our goal is always to provide you with excellent care. Hearing back from our patients is one way we can continue to improve our services. Please take a few minutes to complete the written survey that you may receive in the mail after your visit with us. Thank you!             Your Updated Medication List - Protect others around you: Learn how to safely use, store and throw away your medicines at www.disposemymeds.org.          This list is accurate as of: 10/18/17  1:47 PM.  Always use your most recent med list.                   Brand Name Dispense Instructions for use Diagnosis    iron polysaccharides 150 MG capsule    NIFEREX     Take 150 mg by mouth 2 times daily        mesalamine 800 MG EC tablet    ASACOL HD     Take 800 mg by mouth 3 times daily        prednisoLONE acetate 1 % ophthalmic susp    PRED FORTE    1 Bottle    Place 1 drop into operative eye(s) 4 times daily    Postoperative eye state       PREDNISONE PO      Take 20 mg by  mouth 2 times daily

## 2017-10-18 NOTE — PROGRESS NOTES
1. 1 week post-op status post strabismus surgery:     -Surgery: 10/9/17    1. Right medial rectus recession 4.0 mm with 1/2 tendon width superior transposition  2. Left medial rectus recession 4.0 mm with 1/2 tendon width superior transposition    - Alignment: doing well- residual esotropia present and left hypertropia in primary gaze but patient very happy with reconstructive outcome  - Diplopia: none  - Healing up appropriately  - Tobradex ophthalmic ointment: stop  - Start Predforte 1% four times a day in the operative eye(s) and decrease by one drop daily every week.  Course will complete in 1 month.    Patient asked if he could wear contact lenses.  I advised him as I did before surgery and on the day of surgery that he needs to purchase a pair of glasses to wear so his eyes can heal.  He should not wear contact lenses for 2 months.  He said he would purchase glasses.  He has prescription.    Return to clinic in 3 months or sooner as needed.       Complete documentation of historical and exam elements from today's encounter can be found in the full encounter summary report (not reduplicated in this progress note).  I personally obtained the chief complaint(s) and history of present illness.  I confirmed and edited as necessary the review of systems, past medical/surgical history, family history, social history, and examination findings as documented by others; and I examined the patient myself.  I personally reviewed the relevant tests, images, and reports as documented above.  I formulated and edited as necessary the assessment and plan and discussed the findings and management plan with the patient and family   Vicente Hammond MD

## 2017-10-18 NOTE — NURSING NOTE
Chief Complaints and History of Present Illnesses   Patient presents with     Post Op (Ophthalmology) Both Eyes     RECESSION RESECTION WITH ADJUSTABLE SUTURE BILATERAL     HPI    Affected eye(s):  Both   Symptoms:        Duration:  1 week   Frequency:  Constant       Do you have eye pain now?:  No      Comments:  Pt. States that there has been mild discomfort BE.  No double vision.  Grace Hicks COT 12:44 PM October 18, 2017

## 2017-10-18 NOTE — NURSING NOTE
Chief Complaints and History of Present Illnesses   Patient presents with     Post Op (Ophthalmology) Both Eyes     RECESSION RESECTION WITH ADJUSTABLE SUTURE BILATERAL     HPI    Affected eye(s):  Both   Symptoms:        Duration:  1 week   Frequency:  Constant       Do you have eye pain now?:  No      Comments:  Pt. States that there has been mild discomfort BE.  No double vision.  Grace Hicks COT 12:44 PM October 18, 2017     1 week s/p strabismus surgery for history of:  . 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 Right medial rectus recession 4.0 mm with 1/2 tendon width superior transposition  2. Left medial rectus recession 4.0 mm with 1/2 tendon width superior transposition    Happy with surgical results  No diplopia  Happy with alignment  Healing well.     Jonathan MEDINA 1:17 PM October 18, 2017

## 2017-12-10 DIAGNOSIS — H53.2 DOUBLE VISION: Primary | ICD-10-CM

## 2018-01-04 DIAGNOSIS — H53.2 DOUBLE VISION: Primary | ICD-10-CM

## 2018-01-05 ENCOUNTER — OFFICE VISIT (OUTPATIENT)
Dept: OPHTHALMOLOGY | Facility: CLINIC | Age: 23
End: 2018-01-05
Attending: OPHTHALMOLOGY
Payer: COMMERCIAL

## 2018-01-05 DIAGNOSIS — H50.05 ESOTROPIA, ALTERNATING: Primary | ICD-10-CM

## 2018-01-05 DIAGNOSIS — H53.2 DOUBLE VISION: ICD-10-CM

## 2018-01-05 PROCEDURE — G0463 HOSPITAL OUTPT CLINIC VISIT: HCPCS | Mod: 25,ZF | Performed by: TECHNICIAN/TECHNOLOGIST

## 2018-01-05 PROCEDURE — 92015 DETERMINE REFRACTIVE STATE: CPT | Mod: 52,ZF

## 2018-01-05 ASSESSMENT — TONOMETRY
OS_IOP_MMHG: 07
OD_IOP_MMHG: 09
IOP_METHOD: ICARE

## 2018-01-05 ASSESSMENT — VISUAL ACUITY
OS_CC+: +2
CORRECTION_TYPE: CONTACTS
OD_CC: 20/20
METHOD: SNELLEN - LINEAR
OS_CC: 20/30

## 2018-01-05 ASSESSMENT — CONF VISUAL FIELD
OD_NORMAL: 1
OS_NORMAL: 1
METHOD: COUNTING FINGERS

## 2018-01-05 NOTE — NURSING NOTE
Chief Complaints and History of Present Illnesses   Patient presents with     Post Op (Ophthalmology) Both Eyes     strabismus surgery     HPI    Affected eye(s):  Both   Symptoms:        Frequency:  Constant       Do you have eye pain now?:  No      Comments:  States va is the same since last visit  States no diplopia  No red watery or dry  Nancie Larson COT 11:21 AM January 5, 2018

## 2018-01-05 NOTE — MR AVS SNAPSHOT
After Visit Summary   2018    Raúl Nieto    MRN: [id number]           Patient Information     Date Of Birth          1995        Visit Information        Provider Department      2018 11:30 AM [facility]        Today's Diagnoses     Double vision           Follow-ups after your visit        Follow-up notes from your care team     Return if symptoms worsen or fail to improve.      Who to contact     Please call your clinic at [phone] to:    Ask questions about your health    Make or cancel appointments    Discuss your medicines    Learn about your test results    Speak to your doctor   If you have compliments or concerns about an experience at your clinic, or if you wish to file a complaint, please contact [facility] Patient Relations at [phone] or email us at [email or web address]         Additional Information About Your Visit        MyChart Information     [employer] is an electronic gateway that provides easy, online access to your medical records. With [employer]Finder, you can request a clinic appointment, read your test results, renew a prescription or communicate with your care team.     To sign up for [employer] visit the website at [email or web address]   You will be asked to enter the access code listed below, as well as some personal information. Please follow the directions to create your username and password.     Your access code is: [email or web address]  Expires: 2018 12:07 PM     Your access code will  in 90 days. If you need help or a new code, please contact your [facility] or call [phone] for assistance.        Care EveryWhere ID     This is your Care EveryWhere ID. This could be used by other organizations to access your [place] medical records  [id number]         Blood Pressure from Last 3 Encounters:   10/09/17 121/72    Weight from Last 3 Encounters:    10/09/17 72.6 kg (160 lb)              We Performed the Following     IOP Measurement     IOP Measurement     Sensorimotor        Primary Care Provider Office Phone # Fax #    [facility] [phone]       [address] [place] [id number]        Equal Access to Services     DAYTONLEELA CRYSTAL : Hadii soo gil hadnellieo Soomaali, waaxda luqadaha, qaybta kaalmada adeegyada, waxmario tate nayanjuliano padgettdarshana katerina armando. So [facility] [phone].    ATENCIÓN: Si habla español, tiene a gannon disposición servicios gratuitos de asistencia lingüística. Llame al [phone].    We comply with applicable federal civil rights laws and [place] laws. We do not discriminate on the basis of race, color, national origin, age, disability, sex, sexual orientation, or gender identity.            Thank you!     Thank you for choosing [facility]  for your care. Our goal is always to provide you with excellent care. Hearing back from our patients is one way we can continue to improve our services. Please take a few minutes to complete the written survey that you may receive in the mail after your visit with us. Thank you!             Your Updated Medication List - Protect others around you: Learn how to safely use, store and throw away your medicines at www.disposemymeds.org.          This list is accurate as of: 1/5/18 12:07 PM.  Always use your most recent med list.                   Brand Name Dispense Instructions for use Diagnosis    iron polysaccharides 150 MG capsule    NIFEREX     Take 150 mg by mouth 2 times daily        mesalamine 800 MG EC tablet    ASACOL HD     Take 800 mg by mouth 3 times daily        prednisoLONE acetate 1 % ophthalmic susp    PRED FORTE    1 Bottle    Place 1 drop into operative eye(s) 4 times daily    Postoperative eye state       PREDNISONE PO      Take 20 mg by mouth 2 times daily

## 2018-01-05 NOTE — PROGRESS NOTES
1. Strabismus Surgery: 10/9/17    1. Right medial rectus recession 4.0 mm with 1/2 tendon width superior transposition  2. Left medial rectus recession 4.0 mm with 1/2 tendon width superior transposition    - patient doing well  - very small angle residual esotropia and left hypertropia- he is happy with alignment  - no Diplopia  - healed up well    - follow-up as needed with me         Complete documentation of historical and exam elements from today's encounter can be found in the full encounter summary report (not reduplicated in this progress note).  I personally obtained the chief complaint(s) and history of present illness.  I confirmed and edited as necessary the review of systems, past medical/surgical history, family history, social history, and examination findings as documented by others; and I examined the patient myself.  I personally reviewed the relevant tests, images, and reports as documented above.  I formulated and edited as necessary the assessment and plan and discussed the findings and management plan with the patient and family   Vicente Hammond MD

## 2018-01-15 ASSESSMENT — EXTERNAL EXAM - RIGHT EYE: OD_EXAM: NORMAL

## 2018-01-15 ASSESSMENT — EXTERNAL EXAM - LEFT EYE: OS_EXAM: NORMAL

## 2018-01-15 ASSESSMENT — SLIT LAMP EXAM - LIDS
COMMENTS: NORMAL
COMMENTS: NORMAL

## (undated) DEVICE — GLOVE PROTEXIS MICRO 7.5  2D73PM75

## (undated) DEVICE — EYE SPONGE SPEAR WECK CEL 0008685

## (undated) DEVICE — PACK MINOR EYE CUSTOM ASC

## (undated) DEVICE — SU VICRYL 6-0 S-29 12" J556G

## (undated) DEVICE — SU PLAIN 6-0 G-1DA 18" 770G

## (undated) DEVICE — DRAPE STERI TOWEL SM 1000

## (undated) DEVICE — EYE PREP BETADINE 5% SOLUTION 30ML 0065-0411-30

## (undated) DEVICE — ESU CORD BIPOLAR GREEN 10-4000

## (undated) DEVICE — SOL WATER IRRIG 500ML BOTTLE 2F7113

## (undated) DEVICE — LINEN TOWEL PACK X5 5464

## (undated) RX ORDER — ACETAMINOPHEN 325 MG/1
TABLET ORAL
Status: DISPENSED
Start: 2017-10-09

## (undated) RX ORDER — TETRACAINE HYDROCHLORIDE 5 MG/ML
SOLUTION OPHTHALMIC
Status: DISPENSED
Start: 2017-10-09

## (undated) RX ORDER — GABAPENTIN 300 MG/1
CAPSULE ORAL
Status: DISPENSED
Start: 2017-10-09